# Patient Record
Sex: MALE | Race: WHITE | NOT HISPANIC OR LATINO
[De-identification: names, ages, dates, MRNs, and addresses within clinical notes are randomized per-mention and may not be internally consistent; named-entity substitution may affect disease eponyms.]

---

## 2018-06-13 PROBLEM — Z00.00 ENCOUNTER FOR PREVENTIVE HEALTH EXAMINATION: Status: ACTIVE | Noted: 2018-06-13

## 2018-12-20 ENCOUNTER — APPOINTMENT (OUTPATIENT)
Dept: RADIATION ONCOLOGY | Facility: CLINIC | Age: 68
End: 2018-12-20
Payer: MEDICARE

## 2018-12-20 VITALS
RESPIRATION RATE: 16 BRPM | BODY MASS INDEX: 27.23 KG/M2 | SYSTOLIC BLOOD PRESSURE: 166 MMHG | DIASTOLIC BLOOD PRESSURE: 89 MMHG | HEIGHT: 68 IN | WEIGHT: 179.68 LBS | TEMPERATURE: 97.7 F | HEART RATE: 74 BPM | OXYGEN SATURATION: 89 %

## 2018-12-20 DIAGNOSIS — Z80.0 FAMILY HISTORY OF MALIGNANT NEOPLASM OF DIGESTIVE ORGANS: ICD-10-CM

## 2018-12-20 DIAGNOSIS — Z87.891 PERSONAL HISTORY OF NICOTINE DEPENDENCE: ICD-10-CM

## 2018-12-20 DIAGNOSIS — C34.90 MALIGNANT NEOPLASM OF UNSPECIFIED PART OF UNSPECIFIED BRONCHUS OR LUNG: ICD-10-CM

## 2018-12-20 DIAGNOSIS — Z82.49 FAMILY HISTORY OF ISCHEMIC HEART DISEASE AND OTHER DISEASES OF THE CIRCULATORY SYSTEM: ICD-10-CM

## 2018-12-20 DIAGNOSIS — Z85.528 PERSONAL HISTORY OF OTHER MALIGNANT NEOPLASM OF KIDNEY: ICD-10-CM

## 2018-12-20 DIAGNOSIS — Z84.89 FAMILY HISTORY OF OTHER SPECIFIED CONDITIONS: ICD-10-CM

## 2018-12-20 PROCEDURE — 99205 OFFICE O/P NEW HI 60 MIN: CPT | Mod: 25

## 2018-12-20 RX ORDER — DILTIAZEM HYDROCHLORIDE 240 MG/1
240 TABLET, EXTENDED RELEASE ORAL
Refills: 0 | Status: ACTIVE | COMMUNITY
Start: 2018-12-20

## 2018-12-20 RX ORDER — RIVAROXABAN 20 MG/1
20 TABLET, FILM COATED ORAL
Refills: 0 | Status: ACTIVE | COMMUNITY
Start: 2018-12-20

## 2018-12-20 RX ORDER — IRBESARTAN 300 MG/1
300 TABLET ORAL
Refills: 0 | Status: ACTIVE | COMMUNITY
Start: 2018-12-20

## 2018-12-20 RX ORDER — LABETALOL HYDROCHLORIDE 100 MG/1
100 TABLET, FILM COATED ORAL
Refills: 0 | Status: ACTIVE | COMMUNITY
Start: 2018-12-20

## 2018-12-20 NOTE — REVIEW OF SYSTEMS
[Dizziness] : dizziness [Negative] : Endocrine [Fever] : no fever [Chills] : no chills [Night Sweats] : no night sweats [Dysphagia] : no dysphagia [Loss of Hearing] : no loss of hearing [Odynophagia] : no odynophagia [Mucosal Pain] : no mucosal pain [Confused] : no confusion [Fainting] : no fainting [Difficulty Walking] : no difficulty walking [FreeTextEntry9] : right sided weakness improved [de-identified] : shaking in left hand after surgery, now improving.

## 2018-12-20 NOTE — LETTER GREETING
[Consult Letter:] : Your patient, [unfilled] was seen in my office today for consultation. [Please see my note below.] : Please see my note below. [Dear  ___] : Dear  [unfilled],

## 2018-12-20 NOTE — HISTORY OF PRESENT ILLNESS
[FreeTextEntry1] : Mr. 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  \par \par His history is significant for a robotically assisted right partial nephrectomy in 11/2012 for clear cell carcinoma, PT1a, negative margins. No adjuvant therapy. \par \par In December 2016 surveillance PET identified  multiple areas suspicious of metastatic disease including 35 mm right acetabular lesion, 16 mm LLL pulmonary nodule with mediastinal lymphadenopathy, and adjacent pulmonary nodules.  A left lung FNA on 1/3/2017 was positive for malignant cells, metastatic renal cell carcinoma. \par \par 1/24/17 he underwent LLL wedge resection- VATS. Path showed large cell neuroendocrine carcinoma, lung primary, positive surgical margin, positive visceral pleural biopsy. EBUS at that time showed a level 7 lymph node showing metastatic carcinoma with neuroendocrine features. \par \par He was treated with 5 cycles of chemotherapy with combination of carbo and taxol, initially planned for 6 cycles, worsening pain the right groin. PET 5/2017 showed multiple bone mets including proximal femurs, left pubic bone, right acetabulum, left iliac bone near SIJ, sacrum, left T12 pedicle, L3, right aspect of C1 and right T11, healing fractures of right 7th rib, multiple sites involving the left pleural space, few mediastinal nodes, two new nodules in the superior LLL lung.  He completed palliative radiation therapy in 14 fractions from 5/31/17-6/19/17 to the pelvis. \par \par In 9/2018 He developed right arm numbness and tingling. Was found to have left parietal metastasis. We do not have reports of this scan.  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 Cystic fluid also showed large cell neuroendocrine carcinoma.  MRI 12/6/18 showed interval resection of left parietal metastasis. \par \par Most recent CT chest 12/5/18  showed marked interval improvement in the previously noted extensive bilateral multifocal pneumonic consolidation with residual consolidation in the lingula and to a lesser degree right middle lobe with probable residual small cell necrotic components.  His post operative course was complicated by hyperglycemia and pneumonia. He is tapering off keppra, and has tapered off steroids. \par \par Today he feels well. He denies headaches, focal muscle weakness, nausea, vomiting. Notes the numbness to the right arm now happens infrequently only.

## 2018-12-20 NOTE — PHYSICAL EXAM
[General Appearance - Well Developed] : well developed [General Appearance - Well Nourished] : well nourished [] : no respiratory distress [Heart Rate And Rhythm] : heart rate and rhythm were normal [Heart Sounds] : normal S1 and S2 [No Focal Deficits] : no focal deficits [Sensation] : the sensory exam was normal to light touch and pinprick [Normal] : oriented to person, place and time, the affect was normal, the mood was normal and not anxious [Oriented To Time, Place, And Person] : oriented to person, place, and time

## 2018-12-26 ENCOUNTER — APPOINTMENT (OUTPATIENT)
Dept: NEUROSURGERY | Facility: CLINIC | Age: 68
End: 2018-12-26
Payer: MEDICARE

## 2018-12-26 VITALS
DIASTOLIC BLOOD PRESSURE: 82 MMHG | BODY MASS INDEX: 26.98 KG/M2 | SYSTOLIC BLOOD PRESSURE: 134 MMHG | HEIGHT: 68 IN | HEART RATE: 83 BPM | RESPIRATION RATE: 16 BRPM | WEIGHT: 178 LBS | OXYGEN SATURATION: 95 %

## 2018-12-26 PROCEDURE — 99203 OFFICE O/P NEW LOW 30 MIN: CPT

## 2018-12-27 ENCOUNTER — OUTPATIENT (OUTPATIENT)
Dept: OUTPATIENT SERVICES | Facility: HOSPITAL | Age: 68
LOS: 1 days | Discharge: HOME | End: 2018-12-27
Payer: MEDICARE

## 2018-12-27 PROCEDURE — 77263 THER RADIOLOGY TX PLNG CPLX: CPT

## 2019-01-07 PROCEDURE — 77290 THER RAD SIMULAJ FIELD CPLX: CPT | Mod: 26

## 2019-01-07 PROCEDURE — 77334 RADIATION TREATMENT AID(S): CPT | Mod: 26

## 2019-01-08 ENCOUNTER — OUTPATIENT (OUTPATIENT)
Dept: OUTPATIENT SERVICES | Facility: HOSPITAL | Age: 69
LOS: 1 days | End: 2019-01-08
Payer: MEDICARE

## 2019-01-08 ENCOUNTER — APPOINTMENT (OUTPATIENT)
Dept: MRI IMAGING | Facility: IMAGING CENTER | Age: 69
End: 2019-01-08

## 2019-01-08 ENCOUNTER — CHART COPY (OUTPATIENT)
Age: 69
End: 2019-01-08

## 2019-01-08 DIAGNOSIS — C79.31 SECONDARY MALIGNANT NEOPLASM OF BRAIN: ICD-10-CM

## 2019-01-08 PROCEDURE — 76498 UNLISTED MR PROCEDURE: CPT

## 2019-01-08 PROCEDURE — 77295 3-D RADIOTHERAPY PLAN: CPT | Mod: 26

## 2019-01-08 PROCEDURE — 77300 RADIATION THERAPY DOSE PLAN: CPT | Mod: 26

## 2019-01-08 PROCEDURE — A9585: CPT

## 2019-01-08 PROCEDURE — 77334 RADIATION TREATMENT AID(S): CPT | Mod: 26

## 2019-01-09 ENCOUNTER — APPOINTMENT (OUTPATIENT)
Dept: NEUROSURGERY | Facility: CLINIC | Age: 69
End: 2019-01-09
Payer: MEDICARE

## 2019-01-09 PROCEDURE — 61796 SRS CRANIAL LESION SIMPLE: CPT

## 2019-01-09 PROCEDURE — 61797 SRS CRAN LES SIMPLE ADDL: CPT

## 2019-01-10 PROCEDURE — 77435 SBRT MANAGEMENT: CPT

## 2019-01-18 ENCOUNTER — RESULT REVIEW (OUTPATIENT)
Age: 69
End: 2019-01-18

## 2019-01-18 PROCEDURE — 88321 CONSLTJ&REPRT SLD PREP ELSWR: CPT

## 2019-02-13 ENCOUNTER — CHART COPY (OUTPATIENT)
Age: 69
End: 2019-02-13

## 2019-03-17 ENCOUNTER — FORM ENCOUNTER (OUTPATIENT)
Age: 69
End: 2019-03-17

## 2019-03-18 ENCOUNTER — APPOINTMENT (OUTPATIENT)
Dept: MRI IMAGING | Facility: IMAGING CENTER | Age: 69
End: 2019-03-18

## 2019-03-18 ENCOUNTER — OUTPATIENT (OUTPATIENT)
Dept: OUTPATIENT SERVICES | Facility: HOSPITAL | Age: 69
LOS: 1 days | End: 2019-03-18
Payer: MEDICARE

## 2019-03-18 DIAGNOSIS — C79.31 SECONDARY MALIGNANT NEOPLASM OF BRAIN: ICD-10-CM

## 2019-03-18 PROCEDURE — 70553 MRI BRAIN STEM W/O & W/DYE: CPT

## 2019-03-18 PROCEDURE — 70553 MRI BRAIN STEM W/O & W/DYE: CPT | Mod: 26

## 2019-03-18 PROCEDURE — A9585: CPT

## 2019-03-20 ENCOUNTER — APPOINTMENT (OUTPATIENT)
Dept: NEUROSURGERY | Facility: CLINIC | Age: 69
End: 2019-03-20
Payer: MEDICARE

## 2019-03-20 PROCEDURE — 99024 POSTOP FOLLOW-UP VISIT: CPT

## 2019-03-25 ENCOUNTER — APPOINTMENT (OUTPATIENT)
Dept: RADIATION ONCOLOGY | Facility: CLINIC | Age: 69
End: 2019-03-25
Payer: MEDICARE

## 2019-03-25 VITALS
OXYGEN SATURATION: 90 % | WEIGHT: 181.88 LBS | DIASTOLIC BLOOD PRESSURE: 107 MMHG | HEIGHT: 68 IN | TEMPERATURE: 98.2 F | SYSTOLIC BLOOD PRESSURE: 179 MMHG | RESPIRATION RATE: 16 BRPM | HEART RATE: 85 BPM | BODY MASS INDEX: 27.57 KG/M2

## 2019-03-25 PROCEDURE — 77263 THER RADIOLOGY TX PLNG CPLX: CPT

## 2019-03-25 PROCEDURE — 99024 POSTOP FOLLOW-UP VISIT: CPT

## 2019-03-26 NOTE — REVIEW OF SYSTEMS
[Negative] : Endocrine [Confused] : confusion [Fever] : no fever [Chills] : no chills [Night Sweats] : no night sweats [Dysphagia] : no dysphagia [Loss of Hearing] : no loss of hearing [Odynophagia] : no odynophagia [Mucosal Pain] : no mucosal pain [Dizziness] : no dizziness [Fainting] : no fainting [Difficulty Walking] : no difficulty walking [FreeTextEntry9] : right sided weakness improved [de-identified] : no more shaking to left hand. memory is not what it used to be. Dizziness, improved. Right sided weakness improving. Numbness to right arm improving.

## 2019-03-26 NOTE — LETTER CLOSING
[Sincerely yours,] : Sincerely yours, [FreeTextEntry3] : Simone Christianson MD\par Attending Physician\par Department of Radiation Medicine\par \par \par

## 2019-03-26 NOTE — PHYSICAL EXAM
[General Appearance - Well Developed] : well developed [General Appearance - Well Nourished] : well nourished [] : no respiratory distress [Heart Rate And Rhythm] : heart rate and rhythm were normal [Heart Sounds] : normal S1 and S2 [No Focal Deficits] : no focal deficits [Sensation] : the sensory exam was normal to light touch and pinprick [Normal] : oriented to person, place and time, the affect was normal, the mood was normal and not anxious [Oriented To Time, Place, And Person] : oriented to person, place, and time [de-identified] : 5+ strength bilateral upper extremities; 3+ strength RLE, 5+ strength LLE

## 2019-04-02 ENCOUNTER — FORM ENCOUNTER (OUTPATIENT)
Age: 69
End: 2019-04-02

## 2019-04-03 ENCOUNTER — OUTPATIENT (OUTPATIENT)
Dept: OUTPATIENT SERVICES | Facility: HOSPITAL | Age: 69
LOS: 1 days | End: 2019-04-03
Payer: MEDICARE

## 2019-04-03 ENCOUNTER — APPOINTMENT (OUTPATIENT)
Dept: MRI IMAGING | Facility: IMAGING CENTER | Age: 69
End: 2019-04-03
Payer: MEDICARE

## 2019-04-03 DIAGNOSIS — C79.31 SECONDARY MALIGNANT NEOPLASM OF BRAIN: ICD-10-CM

## 2019-04-03 PROCEDURE — 77334 RADIATION TREATMENT AID(S): CPT | Mod: 26

## 2019-04-03 PROCEDURE — A9585: CPT

## 2019-04-03 PROCEDURE — 76498 UNLISTED MR PROCEDURE: CPT

## 2019-04-03 PROCEDURE — 77290 THER RAD SIMULAJ FIELD CPLX: CPT | Mod: 26

## 2019-04-03 PROCEDURE — 70553 MRI BRAIN STEM W/O & W/DYE: CPT | Mod: 26

## 2019-04-03 PROCEDURE — 70553 MRI BRAIN STEM W/O & W/DYE: CPT

## 2019-04-04 ENCOUNTER — APPOINTMENT (OUTPATIENT)
Dept: NEUROSURGERY | Facility: CLINIC | Age: 69
End: 2019-04-04
Payer: MEDICARE

## 2019-04-04 ENCOUNTER — CHART COPY (OUTPATIENT)
Age: 69
End: 2019-04-04

## 2019-04-04 PROCEDURE — 77334 RADIATION TREATMENT AID(S): CPT | Mod: 26

## 2019-04-04 PROCEDURE — 61796 SRS CRANIAL LESION SIMPLE: CPT | Mod: 78

## 2019-04-04 PROCEDURE — 77295 3-D RADIOTHERAPY PLAN: CPT | Mod: 26

## 2019-04-04 PROCEDURE — 61797 SRS CRAN LES SIMPLE ADDL: CPT

## 2019-04-04 PROCEDURE — 77300 RADIATION THERAPY DOSE PLAN: CPT | Mod: 26

## 2019-04-04 PROCEDURE — 77432 STEREOTACTIC RADIATION TRMT: CPT

## 2019-05-20 ENCOUNTER — OUTPATIENT (OUTPATIENT)
Dept: OUTPATIENT SERVICES | Facility: HOSPITAL | Age: 69
LOS: 1 days | Discharge: HOME | End: 2019-05-20
Payer: MEDICARE

## 2019-05-20 ENCOUNTER — APPOINTMENT (OUTPATIENT)
Dept: RADIATION ONCOLOGY | Facility: CLINIC | Age: 69
End: 2019-05-20
Payer: MEDICARE

## 2019-05-20 VITALS
BODY MASS INDEX: 25.76 KG/M2 | WEIGHT: 169.42 LBS | RESPIRATION RATE: 16 BRPM | SYSTOLIC BLOOD PRESSURE: 138 MMHG | TEMPERATURE: 98.6 F | DIASTOLIC BLOOD PRESSURE: 81 MMHG | OXYGEN SATURATION: 94 % | HEART RATE: 78 BPM

## 2019-05-20 PROCEDURE — 99214 OFFICE O/P EST MOD 30 MIN: CPT | Mod: 25

## 2019-05-20 PROCEDURE — 77290 THER RAD SIMULAJ FIELD CPLX: CPT | Mod: 26

## 2019-05-20 RX ORDER — OXYCODONE 10 MG/1
10 TABLET ORAL
Refills: 0 | Status: ACTIVE | COMMUNITY
Start: 2019-05-20

## 2019-05-20 NOTE — VITALS
[Least Pain Intensity: 0/10] : 0/10 [80: Normal activity with effort; some signs or symptoms of disease.] : 80: Normal activity with effort; some signs or symptoms of disease.  [Maximal Pain Intensity: 0/10] : 0/10

## 2019-05-20 NOTE — REASON FOR VISIT
[Consideration of Curative Therapy] : consideration of curative therapy for [Brain Metastasis] : brain metastasis

## 2019-05-21 PROCEDURE — 77262 THER RADIOLOGY TX PLNG INTRM: CPT

## 2019-05-21 PROCEDURE — 77307 TELETHX ISODOSE PLAN CPLX: CPT | Mod: 26

## 2019-05-21 PROCEDURE — 77334 RADIATION TREATMENT AID(S): CPT | Mod: 26

## 2019-05-24 NOTE — REVIEW OF SYSTEMS
[Confused] : confusion [Negative] : Psychiatric [Shortness Of Breath] : shortness of breath [Cough] : cough [Fever] : no fever [Chills] : no chills [Night Sweats] : no night sweats [Fatigue] : no fatigue [Recent Change In Weight] : ~T no recent weight change [Eye Pain] : no eye pain [Red Eyes] : eyes not red [Dry Eyes] : no dryness of the eyes [Dysphagia] : no dysphagia [Loss of Hearing] : no loss of hearing [Odynophagia] : no odynophagia [Mucosal Pain] : no mucosal pain [Abdominal Pain] : no abdominal pain [Vomiting] : no vomiting [Constipation] : no constipation [Diarrhea] : no diarrhea [Dizziness] : no dizziness [Fainting] : no fainting [Difficulty Walking] : no difficulty walking [FreeTextEntry6] : SOB worsening some lately [FreeTextEntry9] : with pain X 1 month to right shoulder [de-identified] : memory is not what it used to be. very slight intermittent dizziness Right side still somewhat weak

## 2019-05-24 NOTE — HISTORY OF PRESENT ILLNESS
[FreeTextEntry1] : Mr. Joe Orosco presents today for follow up. He completed gamma knife radiation therapy  to the post-op cavity, left temporal, right occipital, left occipital, and two left parietal areas 1/8/19-1/10/19. \par He additionally completed gamma knife radiation to 7 lesions on 4/1/19 via mask based approach, including a right occipital, a left cerebellar, right cerebellar, two left parietal lesions, right frontal, and right cerebellar.  \par \par ONCOLOGY HISTORY\par Mr. 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 His history is significant for a robotically assisted right partial nephrectomy in 11/2012 for clear cell carcinoma, PT1a, negative margins. No adjuvant therapy. \par \par In December 2016 surveillance PET identified  multiple areas suspicious of metastatic disease including 35 mm right acetabular lesion, 16 mm LLL pulmonary nodule with mediastinal lymphadenopathy, and adjacent pulmonary nodules.  A left lung FNA on 1/3/2017 was positive for malignant cells, metastatic renal cell carcinoma. \par \par 1/24/17 he underwent LLL wedge resection- VATS. Path showed large cell neuroendocrine carcinoma, lung primary, positive surgical margin, positive visceral pleural biopsy. EBUS at that time showed a level 7 lymph node showing metastatic carcinoma with neuroendocrine features. \par \par He was treated with 5 cycles of chemotherapy with combination of carbo and taxol, initially planned for 6 cycles, worsening pain the right groin. PET 5/2017 showed multiple bone mets including proximal femurs, left pubic bone, right acetabulum, left iliac bone near SIJ, sacrum, left T12 pedicle, L3, right aspect of C1 and right T11, healing fractures of right 7th rib, multiple sites involving the left pleural space, few mediastinal nodes, two new nodules in the superior LLL lung.  He completed palliative radiation therapy in 14 fractions from 5/31/17-6/19/17 to the pelvis. \par \par In 9/2018 He developed right arm numbness and tingling. Was found to have left parietal metastasis. We do not have reports of this scan.  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 Cystic fluid also showed large cell neuroendocrine carcinoma.  MRI 12/6/18 showed interval resection of left parietal metastasis. \par \par Most recent CT chest 12/5/18  showed marked interval improvement in the previously noted extensive bilateral multifocal pneumonic consolidation with residual consolidation in the lingula and to a lesser degree right middle lobe with probable residual small cell necrotic components.  His post operative course was complicated by hyperglycemia and pneumonia. He is tapering off keppra, and has tapered off steroids. \par \par At the time of initial consult he felt well. He denied headaches, focal muscle weakness, nausea, vomiting. Noted the numbness to the right arm now happens infrequently only.\par \par 3/25/19- Mr. Orosco presents today for follow up. \par After his gamma knife treatment Mr. Orosco noted some slight left sided weakness. \par MRI brain on 3/18/19 showed a new right cerebellar lesion. Post operative changes were seen with cystic area slightly diminished in size, just above the cystic area was an area of nodular enhancement which appeared relatively stable. \par Today he feels overall well. He denies headaches, dizziness. Notes he lost his voice with radiation before, now improving. Right sided weakness is improving. He still has slight intermittent numbness to the right arm. No more shaking to the left arm. \par He had pain to the right leg after radiation, which has been improving recently. He has notably received radiation to bilateral hips and sacrum 2 years ago at [facility] in [facility] [place]. \par \par 5/20/19-Today Mr. Orosco presents with a new MRI. He started having pain to his right shoulder around 1 month ago, throbbing in nature. This is relieved by rest, as well as oxycontin and oxycodone. 4/10 at maximum while on oxycontin and oxycodone. He remains on immunotherapy with Dr. Aguilar. Denies headaches. Still with slight weakness to the right side, which is now aggravated by the pain in his shoulder. \par \par MRI revealed proximal humeral signal abnormality concerning for a mass. radiologic correlation recommended. Adjacent extraosseous signal noted concerning for extraosseous extension. Differential most suggestive of metastatic disease given underlying history. Partial thickness tearing supraspinatus, ingfraspinatus, and subscapularis tendons.

## 2019-05-24 NOTE — PHYSICAL EXAM
[General Appearance - Well Developed] : well developed [General Appearance - Well Nourished] : well nourished [Heart Rate And Rhythm] : heart rate and rhythm were normal [] : no respiratory distress [Heart Sounds] : normal S1 and S2 [No Focal Deficits] : no focal deficits [Sensation] : the sensory exam was normal to light touch and pinprick [Normal] : oriented to person, place and time, the affect was normal, the mood was normal and not anxious [Oriented To Time, Place, And Person] : oriented to person, place, and time [de-identified] : 5+ strength bilateral upper extremities and lower extremities; slight pain when pushing up or down with right arm

## 2019-05-28 PROCEDURE — 77280 THER RAD SIMULAJ FIELD SMPL: CPT | Mod: 26

## 2019-05-28 PROCEDURE — 77431 RADIATION THERAPY MANAGEMENT: CPT

## 2019-06-24 NOTE — VITALS
[Least Pain Intensity: 0/10] : 0/10 [Maximal Pain Intensity: 0/10] : 0/10 [80: Normal activity with effort; some signs or symptoms of disease.] : 80: Normal activity with effort; some signs or symptoms of disease.

## 2019-06-26 ENCOUNTER — APPOINTMENT (OUTPATIENT)
Dept: RADIATION ONCOLOGY | Facility: CLINIC | Age: 69
End: 2019-06-26
Payer: MEDICARE

## 2019-06-26 VITALS
OXYGEN SATURATION: 94 % | HEART RATE: 70 BPM | RESPIRATION RATE: 18 BRPM | DIASTOLIC BLOOD PRESSURE: 95 MMHG | SYSTOLIC BLOOD PRESSURE: 173 MMHG

## 2019-06-26 VITALS — WEIGHT: 158.73 LBS | BODY MASS INDEX: 24.13 KG/M2

## 2019-06-26 PROCEDURE — 99024 POSTOP FOLLOW-UP VISIT: CPT

## 2019-06-26 RX ORDER — PREDNISONE 5 MG/1
5 TABLET ORAL
Refills: 0 | Status: ACTIVE | COMMUNITY
Start: 2019-06-26

## 2019-06-26 RX ORDER — OXYCODONE HYDROCHLORIDE 20 MG/1
20 TABLET, FILM COATED, EXTENDED RELEASE ORAL EVERY 8 HOURS
Refills: 0 | Status: ACTIVE | COMMUNITY
Start: 2019-05-20

## 2019-06-28 NOTE — HISTORY OF PRESENT ILLNESS
[FreeTextEntry1] : Mr. Joe Orosco presents today for follow up. He completed gamma knife radiation therapy  to the post-op cavity, left temporal, right occipital, left occipital, and two left parietal areas 1/8/19-1/10/19. \par He additionally completed gamma knife radiation to 7 lesions on 4/4/19 via mask based approach, including a right occipital, a left cerebellar, right cerebellar, two left parietal lesions, right frontal, and right cerebellar.  \par \par He most recently completed palliative radiation therapy to the right humerus on 5/28/19.\par \par ONCOLOGY HISTORY\par Mr. 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 His history is significant for a robotically assisted right partial nephrectomy in 11/2012 for clear cell carcinoma, PT1a, negative margins. No adjuvant therapy. \par \par In December 2016 surveillance PET identified  multiple areas suspicious of metastatic disease including 35 mm right acetabular lesion, 16 mm LLL pulmonary nodule with mediastinal lymphadenopathy, and adjacent pulmonary nodules.  A left lung FNA on 1/3/2017 was positive for malignant cells, metastatic renal cell carcinoma. \par \par 1/24/17 he underwent LLL wedge resection- VATS. Path showed large cell neuroendocrine carcinoma, lung primary, positive surgical margin, positive visceral pleural biopsy. EBUS at that time showed a level 7 lymph node showing metastatic carcinoma with neuroendocrine features. \par \par He was treated with 5 cycles of chemotherapy with combination of carbo and taxol, initially planned for 6 cycles, worsening pain the right groin. PET 5/2017 showed multiple bone mets including proximal femurs, left pubic bone, right acetabulum, left iliac bone near SIJ, sacrum, left T12 pedicle, L3, right aspect of C1 and right T11, healing fractures of right 7th rib, multiple sites involving the left pleural space, few mediastinal nodes, two new nodules in the superior LLL lung.  He completed palliative radiation therapy in 14 fractions from 5/31/17-6/19/17 to the pelvis. \par \par In 9/2018 He developed right arm numbness and tingling. Was found to have left parietal metastasis. We do not have reports of this scan.  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 Cystic fluid also showed large cell neuroendocrine carcinoma.  MRI 12/6/18 showed interval resection of left parietal metastasis. \par \par Most recent CT chest 12/5/18  showed marked interval improvement in the previously noted extensive bilateral multifocal pneumonic consolidation with residual consolidation in the lingula and to a lesser degree right middle lobe with probable residual small cell necrotic components.  His post operative course was complicated by hyperglycemia and pneumonia. He is tapering off keppra, and has tapered off steroids. \par \par At the time of initial consult he felt well. He denied headaches, focal muscle weakness, nausea, vomiting. Noted the numbness to the right arm now happens infrequently only.\par \par 3/25/19- Mr. Orosco presents today for follow up. \par After his gamma knife treatment Mr. Orosco noted some slight left sided weakness. \par MRI brain on 3/18/19 showed a new right cerebellar lesion. Post operative changes were seen with cystic area slightly diminished in size, just above the cystic area was an area of nodular enhancement which appeared relatively stable. \par Today he feels overall well. He denies headaches, dizziness. Notes he lost his voice with radiation before, now improving. Right sided weakness is improving. He still has slight intermittent numbness to the right arm. No more shaking to the left arm. \par He had pain to the right leg after radiation, which has been improving recently. He has notably received radiation to bilateral hips and sacrum 2 years ago at [facility] in [facility] [place]. \par \par 5/20/19-Today Mr. Orosco presents with a new MRI. He started having pain to his right shoulder around 1 month ago, throbbing in nature. This is relieved by rest, as well as oxycontin and oxycodone. 4/10 at maximum while on oxycontin and oxycodone. He remains on immunotherapy with Dr. Aguilar. Denies headaches. Still with slight weakness to the right side, which is now aggravated by the pain in his shoulder. \par \par MRI revealed proximal humeral signal abnormality concerning for a mass. radiologic correlation recommended. Adjacent extraosseous signal noted concerning for extraosseous extension. Differential most suggestive of metastatic disease given underlying history. Partial thickness tearing supraspinatus, ingfraspinatus, and subscapularis tendons. \par \par 6/26/19- Mr. Orosco presents today for post treatment evaluation. He currently has an MRI and follow up with Dr. Box on 7/3/19.\par Today he notes he still has pain in his right shoulder. He is currently being treated with prednisone and recently completed a course of antibiotics for a pneumonia. His appetite is much decreased. Has lost 10 pounds since his visit 1 month ago. \par \par Denies headache, notes right side is still weaker. Continues on opdivo.

## 2019-06-28 NOTE — REVIEW OF SYSTEMS
[Shortness Of Breath] : shortness of breath [Cough] : cough [Confused] : confusion [Negative] : Endocrine [Fever] : no fever [Chills] : no chills [Night Sweats] : no night sweats [Fatigue] : no fatigue [Recent Change In Weight] : ~T no recent weight change [Eye Pain] : no eye pain [Red Eyes] : eyes not red [Dry Eyes] : no dryness of the eyes [Dysphagia] : no dysphagia [Loss of Hearing] : no loss of hearing [Odynophagia] : no odynophagia [Mucosal Pain] : no mucosal pain [Abdominal Pain] : no abdominal pain [Vomiting] : no vomiting [Constipation] : no constipation [Diarrhea] : no diarrhea [Dizziness] : no dizziness [Fainting] : no fainting [Difficulty Walking] : no difficulty walking [FreeTextEntry2] : fatigue improving [FreeTextEntry6] : SOB improving, s/p antibiotics and currently on prednisone [FreeTextEntry9] : still with pain to right shoulder with movement, improved 50% better [de-identified] : memory is not what it used to be. denies dizziness, denies headaches Right side still somewhat weak. with tingling to right shoulder and neck [de-identified] : moods are improving

## 2019-06-28 NOTE — PHYSICAL EXAM
[General Appearance - Well Developed] : well developed [General Appearance - Well Nourished] : well nourished [] : no respiratory distress [Heart Rate And Rhythm] : heart rate and rhythm were normal [Heart Sounds] : normal S1 and S2 [No Focal Deficits] : no focal deficits [Sensation] : the sensory exam was normal to light touch and pinprick [Normal] : oriented to person, place and time, the affect was normal, the mood was normal and not anxious [Oriented To Time, Place, And Person] : oriented to person, place, and time [de-identified] : 5+ strength bilateral upper extremities and lower extremities; Point tenderness to lateral mid humerus and right shoulder joint

## 2019-07-03 ENCOUNTER — APPOINTMENT (OUTPATIENT)
Dept: NEUROSURGERY | Facility: CLINIC | Age: 69
End: 2019-07-03

## 2019-07-03 ENCOUNTER — APPOINTMENT (OUTPATIENT)
Dept: MRI IMAGING | Facility: IMAGING CENTER | Age: 69
End: 2019-07-03

## 2019-07-11 ENCOUNTER — APPOINTMENT (OUTPATIENT)
Dept: RADIATION ONCOLOGY | Facility: CLINIC | Age: 69
End: 2019-07-11

## 2019-07-18 ENCOUNTER — APPOINTMENT (OUTPATIENT)
Dept: RADIATION ONCOLOGY | Facility: CLINIC | Age: 69
End: 2019-07-18

## 2019-07-25 ENCOUNTER — CHART COPY (OUTPATIENT)
Age: 69
End: 2019-07-25

## 2019-08-07 ENCOUNTER — APPOINTMENT (OUTPATIENT)
Dept: RADIATION ONCOLOGY | Facility: CLINIC | Age: 69
End: 2019-08-07
Payer: MEDICARE

## 2019-08-07 DIAGNOSIS — C79.51 SECONDARY MALIGNANT NEOPLASM OF BONE: ICD-10-CM

## 2019-08-07 DIAGNOSIS — C79.31 SECONDARY MALIGNANT NEOPLASM OF BRAIN: ICD-10-CM

## 2019-08-07 PROCEDURE — 99024 POSTOP FOLLOW-UP VISIT: CPT

## 2019-08-07 PROCEDURE — 77290 THER RAD SIMULAJ FIELD CPLX: CPT | Mod: 26

## 2019-08-08 PROBLEM — C79.31 BRAIN METASTASES: Status: ACTIVE | Noted: 2018-12-20

## 2019-08-08 PROBLEM — C79.51 BONE METASTASES: Status: ACTIVE | Noted: 2019-08-08

## 2019-08-08 NOTE — LETTER CLOSING
[FreeTextEntry3] : Simone Christianson MD\par Attending Physician\par Department of Radiation Medicine\par \par \par

## 2019-08-08 NOTE — PHYSICAL EXAM
[Normal] : oriented to person, place and time, the affect was normal, the mood was normal and not anxious [de-identified] : Severe right upper arm pain.  LImited ROM.   [de-identified] : CN intact.  No focal deficit in strength

## 2019-08-08 NOTE — REVIEW OF SYSTEMS
[Chills] : no chills [Fatigue] : fatigue [Night Sweats] : no night sweats [Confused] : confusion [Dizziness] : no dizziness [Difficulty Walking] : no difficulty walking [Fainting] : no fainting [FreeTextEntry2] : as per HPI [Negative] : Endocrine [FreeTextEntry9] : as per HPI

## 2019-08-08 NOTE — HISTORY OF PRESENT ILLNESS
[FreeTextEntry1] : Mr. Joe Orosco presents today for follow up. He completed gamma knife radiation therapy  to the post-op cavity, left temporal, right occipital, left occipital, and two left parietal areas 1/8/19-1/10/19. He additionally completed gamma knife radiation to 7 lesions on 4/4/19 via mask based approach, including a right occipital, a left cerebellar, right cerebellar, two left parietal lesions, right frontal, and right cerebellar.  He most recently completed palliative radiation therapy to the right humerus on 5/28/19. He presents to discuss persistent/progressive pain in the right humerus.  \par \par ONCOLOGY HISTORY\par Mr. 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 His history is significant for a robotically assisted right partial nephrectomy in 11/2012 for clear cell carcinoma, PT1a, negative margins. No adjuvant therapy. \par \par In December 2016 surveillance PET identified  multiple areas suspicious of metastatic disease including 35 mm right acetabular lesion, 16 mm LLL pulmonary nodule with mediastinal lymphadenopathy, and adjacent pulmonary nodules.  A left lung FNA on 1/3/2017 was positive for malignant cells, metastatic renal cell carcinoma. \par \par 1/24/17 he underwent LLL wedge resection- VATS. Path showed large cell neuroendocrine carcinoma, lung primary, positive surgical margin, positive visceral pleural biopsy. EBUS at that time showed a level 7 lymph node showing metastatic carcinoma with neuroendocrine features. \par \par He was treated with 5 cycles of chemotherapy with combination of carbo and taxol, initially planned for 6 cycles, worsening pain the right groin. PET 5/2017 showed multiple bone mets including proximal femurs, left pubic bone, right acetabulum, left iliac bone near SIJ, sacrum, left T12 pedicle, L3, right aspect of C1 and right T11, healing fractures of right 7th rib, multiple sites involving the left pleural space, few mediastinal nodes, two new nodules in the superior LLL lung.  He completed palliative radiation therapy in 14 fractions from 5/31/17-6/19/17 to the pelvis. \par \par In 9/2018 He developed right arm numbness and tingling. Was found to have left parietal metastasis. We do not have reports of this scan.  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 Cystic fluid also showed large cell neuroendocrine carcinoma.  MRI 12/6/18 showed interval resection of left parietal metastasis. \par \par Most recent CT chest 12/5/18  showed marked interval improvement in the previously noted extensive bilateral multifocal pneumonic consolidation with residual consolidation in the lingula and to a lesser degree right middle lobe with probable residual small cell necrotic components.  His post operative course was complicated by hyperglycemia and pneumonia. He is tapering off keppra, and has tapered off steroids. \par \par At the time of initial consult he felt well. He denied headaches, focal muscle weakness, nausea, vomiting. Noted the numbness to the right arm now happens infrequently only.\par \par 3/25/19- Mr. Orosco presents today for follow up. \par After his gamma knife treatment Mr. Orosco noted some slight left sided weakness. \par MRI brain on 3/18/19 showed a new right cerebellar lesion. Post operative changes were seen with cystic area slightly diminished in size, just above the cystic area was an area of nodular enhancement which appeared relatively stable. \par Today he feels overall well. He denies headaches, dizziness. Notes he lost his voice with radiation before, now improving. Right sided weakness is improving. He still has slight intermittent numbness to the right arm. No more shaking to the left arm. \par He had pain to the right leg after radiation, which has been improving recently. He has notably received radiation to bilateral hips and sacrum 2 years ago at [facility] in [facility] [place]. \par \par 5/20/19-Today Mr. Orosco presents with a new MRI. He started having pain to his right shoulder around 1 month ago, throbbing in nature. This is relieved by rest, as well as oxycontin and oxycodone. 4/10 at maximum while on oxycontin and oxycodone. He remains on immunotherapy with Dr. Aguilar. Denies headaches. Still with slight weakness to the right side, which is now aggravated by the pain in his shoulder. \par \par MRI revealed proximal humeral signal abnormality concerning for a mass. radiologic correlation recommended. Adjacent extraosseous signal noted concerning for extraosseous extension. Differential most suggestive of metastatic disease given underlying history. Partial thickness tearing supraspinatus, ingfraspinatus, and subscapularis tendons. \par \par 6/26/19- Mr. Orosco presents today for post treatment evaluation. He currently has an MRI and follow up with Dr. Bxo on \par \par 7/3/19 - Today he notes he still has pain in his right shoulder. He is currently being treated with prednisone and recently completed a course of antibiotics for a pneumonia. His appetite is much decreased. Has lost 10 pounds since his visit 1 month ago. \par Denies headache, notes right side is still weaker. Continues on opdivo. \par \par 8/7/19 - He feels that post-treatment his range of motion has improved.  He is able to abduct his shoulder approximately 60 degrees, which he could not do before treatment.  He has difficulty discussing pain response.  He has significant pain taking a series of opiates, which result in him being sleepy throughout the day.  He feels the pain is largely constant, with some response to pain medications.  The pain may have been better a few weeks ago, but seems to be getting worse now.  He is not satisfied with his pain control.  He also reports being more confused recently.  He is due for an MRI, which he has been postponing.

## 2019-08-08 NOTE — VITALS
[Maximal Pain Intensity: 0/10] : 0/10 [NoTreatment Scheduled] : no treatment scheduled [Least Pain Intensity: 0/10] : 0/10 [ECOG Performance Status: 2 - Ambulatory and capable of all self care but unable to carry out any work activities] : Performance Status: 2 - Ambulatory and capable of all self care but unable to carry out any work activities. Up and about more than 50% of waking hours [70: Cares for self; unalbe to carry on normal activity or do active work.] : 70: Cares for self; unable to carry on normal activity or do active work.

## 2019-08-09 ENCOUNTER — FORM ENCOUNTER (OUTPATIENT)
Age: 69
End: 2019-08-09

## 2019-08-09 PROCEDURE — 77307 TELETHX ISODOSE PLAN CPLX: CPT | Mod: 26

## 2019-08-09 PROCEDURE — 77334 RADIATION TREATMENT AID(S): CPT | Mod: 26

## 2019-08-10 ENCOUNTER — OUTPATIENT (OUTPATIENT)
Dept: OUTPATIENT SERVICES | Facility: HOSPITAL | Age: 69
LOS: 1 days | End: 2019-08-10
Payer: MEDICARE

## 2019-08-10 ENCOUNTER — APPOINTMENT (OUTPATIENT)
Dept: MRI IMAGING | Facility: IMAGING CENTER | Age: 69
End: 2019-08-10
Payer: MEDICARE

## 2019-08-10 DIAGNOSIS — C79.31 SECONDARY MALIGNANT NEOPLASM OF BRAIN: ICD-10-CM

## 2019-08-10 PROCEDURE — 70553 MRI BRAIN STEM W/O & W/DYE: CPT

## 2019-08-10 PROCEDURE — 70553 MRI BRAIN STEM W/O & W/DYE: CPT | Mod: 26

## 2019-08-10 PROCEDURE — A9585: CPT

## 2019-08-12 PROCEDURE — 77280 THER RAD SIMULAJ FIELD SMPL: CPT | Mod: 26

## 2019-08-12 NOTE — VITALS
[Maximal Pain Intensity: 8/10] : 8/10 [Least Pain Intensity: 6/10] : 6/10 [Pain Location: ___] : Pain Location: [unfilled] [NoTreatment Scheduled] : no treatment scheduled [Opioid] : opioid [ECOG Performance Status: 2 - Ambulatory and capable of all self care but unable to carry out any work activities] : Performance Status: 2 - Ambulatory and capable of all self care but unable to carry out any work activities. Up and about more than 50% of waking hours [70: Cares for self; unalbe to carry on normal activity or do active work.] : 70: Cares for self; unable to carry on normal activity or do active work.

## 2019-08-12 NOTE — PHYSICAL EXAM
[Normal] : oriented to person, place and time, the affect was normal, the mood was normal and not anxious [de-identified] : Severe right upper arm pain.  LImited ROM.   [de-identified] : CN intact.  No focal deficit in strength

## 2019-08-12 NOTE — HISTORY OF PRESENT ILLNESS
[FreeTextEntry1] : Mr. Joe Orosco presents today for follow up. He completed gamma knife radiation therapy  to the post-op cavity, left temporal, right occipital, left occipital, and two left parietal areas 1/8/19-1/10/19. He additionally completed gamma knife radiation to 7 lesions on 4/4/19 via mask based approach, including a right occipital, a left cerebellar, right cerebellar, two left parietal lesions, right frontal, and right cerebellar.  He most recently completed palliative radiation therapy to the right humerus on 5/28/19. He presents to discuss persistent/progressive pain in the right humerus.  \par \par ONCOLOGY HISTORY\par Mr. 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 His history is significant for a robotically assisted right partial nephrectomy in 11/2012 for clear cell carcinoma, PT1a, negative margins. No adjuvant therapy. \par \par In December 2016 surveillance PET identified  multiple areas suspicious of metastatic disease including 35 mm right acetabular lesion, 16 mm LLL pulmonary nodule with mediastinal lymphadenopathy, and adjacent pulmonary nodules.  A left lung FNA on 1/3/2017 was positive for malignant cells, metastatic renal cell carcinoma. \par \par 1/24/17 he underwent LLL wedge resection- VATS. Path showed large cell neuroendocrine carcinoma, lung primary, positive surgical margin, positive visceral pleural biopsy. EBUS at that time showed a level 7 lymph node showing metastatic carcinoma with neuroendocrine features. \par \par He was treated with 5 cycles of chemotherapy with combination of carbo and taxol, initially planned for 6 cycles, worsening pain the right groin. PET 5/2017 showed multiple bone mets including proximal femurs, left pubic bone, right acetabulum, left iliac bone near SIJ, sacrum, left T12 pedicle, L3, right aspect of C1 and right T11, healing fractures of right 7th rib, multiple sites involving the left pleural space, few mediastinal nodes, two new nodules in the superior LLL lung.  He completed palliative radiation therapy in 14 fractions from 5/31/17-6/19/17 to the pelvis. \par \par In 9/2018 He developed right arm numbness and tingling. Was found to have left parietal metastasis. We do not have reports of this scan.  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 Cystic fluid also showed large cell neuroendocrine carcinoma.  MRI 12/6/18 showed interval resection of left parietal metastasis. \par \par Most recent CT chest 12/5/18  showed marked interval improvement in the previously noted extensive bilateral multifocal pneumonic consolidation with residual consolidation in the lingula and to a lesser degree right middle lobe with probable residual small cell necrotic components.  His post operative course was complicated by hyperglycemia and pneumonia. He is tapering off keppra, and has tapered off steroids. \par \par At the time of initial consult he felt well. He denied headaches, focal muscle weakness, nausea, vomiting. Noted the numbness to the right arm now happens infrequently only.\par \par 3/25/19- Mr. Orosco presents today for follow up. \par After his gamma knife treatment Mr. Orosco noted some slight left sided weakness. \par MRI brain on 3/18/19 showed a new right cerebellar lesion. Post operative changes were seen with cystic area slightly diminished in size, just above the cystic area was an area of nodular enhancement which appeared relatively stable. \par Today he feels overall well. He denies headaches, dizziness. Notes he lost his voice with radiation before, now improving. Right sided weakness is improving. He still has slight intermittent numbness to the right arm. No more shaking to the left arm. \par He had pain to the right leg after radiation, which has been improving recently. He has notably received radiation to bilateral hips and sacrum 2 years ago at [facility] in [facility]. \par \par 5/20/19-Today Mr. Orosco presents with a new MRI. He started having pain to his right shoulder around 1 month ago, throbbing in nature. This is relieved by rest, as well as oxycontin and oxycodone. 4/10 at maximum while on oxycontin and oxycodone. He remains on immunotherapy with Dr. Aguilar. Denies headaches. Still with slight weakness to the right side, which is now aggravated by the pain in his shoulder. \par \par MRI revealed proximal humeral signal abnormality concerning for a mass. radiologic correlation recommended. Adjacent extraosseous signal noted concerning for extraosseous extension. Differential most suggestive of metastatic disease given underlying history. Partial thickness tearing supraspinatus, ingfraspinatus, and subscapularis tendons. \par \par 6/26/19- Mr. Orosco presents today for post treatment evaluation. He currently has an MRI and follow up with Dr. Box on \par \par 7/3/19 - Today he notes he still has pain in his right shoulder. He is currently being treated with prednisone and recently completed a course of antibiotics for a pneumonia. His appetite is much decreased. Has lost 10 pounds since his visit 1 month ago. \par Denies headache, notes right side is still weaker. Continues on opdivo. \par \par 8/7/19 - He feels that post-treatment his range of motion has improved.  He is able to abduct his shoulder approximately 60 degrees, which he could not do before treatment.  He has difficulty discussing pain response.  He has significant pain taking a series of opiates, which result in him being sleepy throughout the day.  He feels the pain is largely constant, with some response to pain medications.  The pain may have been better a few weeks ago, but seems to be getting worse now.  He is not satisfied with his pain control.  He also reports being more confused recently.  He is due for an MRI, which he has been postponing.  \par \par \par 8/12/19 OTV Received 1/1 fractions. 800cGy\par Noted to be forgetful, some confusion. Reports fatigue, right upper extremity pain with limited ROM, taking pain med with minimal effect. MRI done on 8/10/19 \par Continues on immunotherapy

## 2019-08-12 NOTE — REVIEW OF SYSTEMS
[Fatigue] : fatigue [Confused] : confusion [Negative] : Endocrine [Chills] : no chills [Night Sweats] : no night sweats [Dizziness] : no dizziness [Fainting] : no fainting [Difficulty Walking] : no difficulty walking [FreeTextEntry2] : as per HPI [FreeTextEntry9] : as per HPI

## 2019-08-22 ENCOUNTER — APPOINTMENT (OUTPATIENT)
Dept: RADIATION ONCOLOGY | Facility: CLINIC | Age: 69
End: 2019-08-22

## 2022-06-17 NOTE — HISTORY OF PRESENT ILLNESS
Met with patient via zoom format.  States she did not get the new order for hydrxyzine 10 mg bid.  Perfect served Dr Santacruz to fill script.  Patient describes her mood as slowy with less depressive symptoms with the escitalopram dose,  Still has anxiety in the morning difficult to manage.  Encouraged use of coping skills and patient is agreeable.  States will  script when ordered.  Denies SI/HI.  Appears motivated for treatment.   [FreeTextEntry1] : Mr. Joe Orosco presents today for follow up. He completed gamma knife radiation therapy  to the post-op cavity, left temporal, right occipital, left occipital, and two left parietal areas 1/8/19-1/10/19. \par \par ONCOLOGY HISTORY\par Mr. 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 His history is significant for a robotically assisted right partial nephrectomy in 11/2012 for clear cell carcinoma, PT1a, negative margins. No adjuvant therapy. \par \par In December 2016 surveillance PET identified  multiple areas suspicious of metastatic disease including 35 mm right acetabular lesion, 16 mm LLL pulmonary nodule with mediastinal lymphadenopathy, and adjacent pulmonary nodules.  A left lung FNA on 1/3/2017 was positive for malignant cells, metastatic renal cell carcinoma. \par \par 1/24/17 he underwent LLL wedge resection- VATS. Path showed large cell neuroendocrine carcinoma, lung primary, positive surgical margin, positive visceral pleural biopsy. EBUS at that time showed a level 7 lymph node showing metastatic carcinoma with neuroendocrine features. \par \par He was treated with 5 cycles of chemotherapy with combination of carbo and taxol, initially planned for 6 cycles, worsening pain the right groin. PET 5/2017 showed multiple bone mets including proximal femurs, left pubic bone, right acetabulum, left iliac bone near SIJ, sacrum, left T12 pedicle, L3, right aspect of C1 and right T11, healing fractures of right 7th rib, multiple sites involving the left pleural space, few mediastinal nodes, two new nodules in the superior LLL lung.  He completed palliative radiation therapy in 14 fractions from 5/31/17-6/19/17 to the pelvis. \par \par In 9/2018 He developed right arm numbness and tingling. Was found to have left parietal metastasis. We do not have reports of this scan.  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 Cystic fluid also showed large cell neuroendocrine carcinoma.  MRI 12/6/18 showed interval resection of left parietal metastasis. \par \par Most recent CT chest 12/5/18  showed marked interval improvement in the previously noted extensive bilateral multifocal pneumonic consolidation with residual consolidation in the lingula and to a lesser degree right middle lobe with probable residual small cell necrotic components.  His post operative course was complicated by hyperglycemia and pneumonia. He is tapering off keppra, and has tapered off steroids. \par \par At the time of initial consult he felt well. He denied headaches, focal muscle weakness, nausea, vomiting. Noted the numbness to the right arm now happens infrequently only.\par \par 3/25/19- Mr. Orosco presents today for follow up. \par After his gamma knife treatment Mr. Orosco noted some slight left sided weakness. \par MRI brain on 3/18/19 showed a new right cerebellar lesion. Post operative changes were seen with cystic area slightly diminished in size, just above the cystic area was an area of nodular enhancement which appeared relatively stable. \par \par Today he feels overall well. He denies headaches, dizziness. Notes he lost his voice with radiation before, now improving. Right sided weakness is improving. He still has slight intermittent numbness to the right arm. No more shaking to the left arm. \par He had pain to the right leg after radiation, which has been improving recently. He has notably received radiation to bilateral hips and sacrum 2 years [place] [facility] [place] [facility] [place]

## 2022-12-24 NOTE — DISEASE MANAGEMENT
[Clinical] : TNM Stage: c [IV] : IV [TTNM] : x [NTNM] : x [MTNM] : 1 [de-identified] : 974 [de-identified] : 074 [de-identified] : Right proximal humerus verbal cues/1 person assist